# Patient Record
Sex: FEMALE | Race: WHITE | NOT HISPANIC OR LATINO
[De-identification: names, ages, dates, MRNs, and addresses within clinical notes are randomized per-mention and may not be internally consistent; named-entity substitution may affect disease eponyms.]

---
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
## 2024-03-20 RX ORDER — BLOOD SUGAR DIAGNOSTIC
STRIP MISCELLANEOUS
Qty: 600 | Refills: 3 | Status: ACTIVE | COMMUNITY
Start: 2019-07-03 | End: 1900-01-01

## 2024-05-20 ENCOUNTER — APPOINTMENT (OUTPATIENT)
Dept: PEDIATRIC ENDOCRINOLOGY | Facility: CLINIC | Age: 19
End: 2024-05-20

## 2024-05-20 ENCOUNTER — APPOINTMENT (OUTPATIENT)
Dept: PEDIATRIC ENDOCRINOLOGY | Facility: CLINIC | Age: 19
End: 2024-05-20
Payer: COMMERCIAL

## 2024-05-20 VITALS
SYSTOLIC BLOOD PRESSURE: 127 MMHG | HEART RATE: 101 BPM | WEIGHT: 140 LBS | BODY MASS INDEX: 22.23 KG/M2 | HEIGHT: 66.46 IN | DIASTOLIC BLOOD PRESSURE: 86 MMHG

## 2024-05-20 DIAGNOSIS — E10.65 TYPE 1 DIABETES MELLITUS WITH HYPERGLYCEMIA: ICD-10-CM

## 2024-05-20 LAB
BILIRUB UR QL STRIP: NORMAL
CLARITY UR: CLEAR
COLLECTION METHOD: NORMAL
GLUCOSE BLDC GLUCOMTR-MCNC: 125
GLUCOSE UR-MCNC: NORMAL
HBA1C MFR BLD HPLC: 9.8
HCG UR QL: 0.2 EU/DL
HGB UR QL STRIP.AUTO: NORMAL
KETONES UR-MCNC: NORMAL
LEUKOCYTE ESTERASE UR QL STRIP: NORMAL
NITRITE UR QL STRIP: NORMAL
PH UR STRIP: 6.5
PROT UR STRIP-MCNC: NORMAL
SP GR UR STRIP: 1.02

## 2024-05-20 PROCEDURE — 82962 GLUCOSE BLOOD TEST: CPT

## 2024-05-20 PROCEDURE — 95251 CONT GLUC MNTR ANALYSIS I&R: CPT

## 2024-05-20 PROCEDURE — 83036 HEMOGLOBIN GLYCOSYLATED A1C: CPT | Mod: QW

## 2024-05-20 PROCEDURE — 99215 OFFICE O/P EST HI 40 MIN: CPT | Mod: 25

## 2024-05-20 PROCEDURE — 81003 URINALYSIS AUTO W/O SCOPE: CPT | Mod: QW

## 2024-05-20 NOTE — ASSESSMENT
[FreeTextEntry1] : 18 year 10 month old female with poorly controlled type 1 diabetes. HbA1C 9.8% (decreased from 10.8% in December 2023). Some improvement in bolusing for food, however still multiple days without carb entry. Patient in automated mode 16% of the time (decreased from 37% at the last visit).  She has a hx microalbuminuria, likely due to uncontrolled diabetes.      Plan:  1. Emphasized importance of bolusing for all meals and correct high blood sugars to optimize diabetes control.   2. Change ISF 12am-6am from 60 to 55; 6am-12pm from 30 to 25; 12pm-12am from 25 to 20.  3. F/u Ophthalmology for annual dilated eye exam and have report faxed to us.  4. Will order repeat Albumin/Creatinine at the next visit.

## 2024-05-20 NOTE — PHYSICAL EXAM
[Healthy Appearing] : healthy appearing [Well Nourished] : well nourished [Normal Appearance] : normal appearance [Sharp Optic Discs] : sharp optic disc [Well formed] : well formed [Normally Set] : normally set [WNL for age] : within normal limits of age [None] : there were no thyroid nodules [Normal S1 and S2] : normal S1 and S2 [Clear to Ausculation Bilaterally] : clear to auscultation bilaterally [Abdomen Soft] : soft [Abdomen Tenderness] : non-tender [] : no hepatosplenomegaly [Normal] : normal  [Goiter] : no goiter [Murmur] : no murmurs [de-identified] : No lipohypertrophy [de-identified] : Intact distal pulses, no caluses

## 2024-05-20 NOTE — THERAPY
[Today's Date] : [unfilled] [Humalog] : Humalog [_____] :  [unfilled] units/hour [BG Target = ____] : BG Target = [unfilled] [Insulin Sensitivity Factor = ____] : Insulin Sensitivity Factor = [unfilled] [FreeTextEntry3] : [facility] 12am-6am=7; 6am-12pm=5; 12pm-12am=4

## 2024-05-20 NOTE — HISTORY OF PRESENT ILLNESS
[Other: ___] :  blood sugar levels are tested [unfilled] times per day [2-3] : the pump insertion site is changed every 2 - 3 days [Arms] : arms [Glucagon at Home] : has glucagon at home [Regular Periods] : regular periods [Previous Hypoglycemic Seizure] : has no history of hypoglycemic seizure [FreeTextEntry2] : Augusta is a 18 year 10 month old girl with type 1 diabetes on Omnipod 5 insulin pump here for a follow up visit.  Patient has been having recurrent episodes of sinusitis and received several courses of antibiotics in the past 3 month.  Denied hypoglycemia episodes.  Trying to bolus for food more consistently.  Most of the times in manual mode, as 'forgets to switch back to auto after correcting high blood sugars''.  Denied pump site issues, pump malfunction, MD/ER referral for diabetes related issues.   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 Using a Dexcom G6 - MISSED SHOTS: gives insulin before meals, misses multiple food boluses and corrections  - TIMES OF HYPERGLYCEMIA: most of day; as per patient gets angry and really thirsty; sometimes checks for ketones - TIMES OF HYPOGLYCEMIA: have more lows; treats with a juice box; shaky, dizzy and sweating; more frequent lows - PARENTAL PART IN CARE: patient takes own sugar, gives boluses, changes Dexcom and pods; changed pod every 3 days - RECENT HOSPITALIZATIONS: none - RECENT ILLNESS: sick for the last 2 months, s/p 3 different antibiotics; severe cold and allergy symptoms - ACTIVITY LEVEL: walks a lot at school campus 6 miles a day; - MENSTRUAL HISTORY: LMP: 5/7/2024 for 5 days; regular periods  - PUMP-SPECIFIC: primes pump at appropriate times, pump type is Omnipod 5, site change frequency every 2-3 days, arms and legs site type used is pod, pump insulin is Humalog; Dexcom is on legs too.  - DIABETES EDUCATION TOPICS COVERED: reviewed sick day guidelines, reviewed rotating pump sites, Reviewed pump site problems and prevention of DKA, when to check ketones;   Short Acting Insulin: Humalog  Basal Rate:  12 am= 1.65 unit/hr  3 am= 1.40 units/hr  6 am=1.35 units/hr  9 am=1.6 units/hr 12 pm= 1.95 units/hr  3 pm= 2 units/hr   Meal Bolus Insulin:  I:C: 12am-1:7  6am-1:5 12pm= 1:4  Correction Insulin:  ISF= 12 am= 60   6 am= 32 12pm=28  BG Target = 110 (corrects above 125) Insulin on Board (IOB) Duration = 3 hours     OTHER:  Eye exam- 05/18/2023 (report on file); has an appointment on Thursday Dental exam- 06/2022: Due; has an appointment for 6/14/2024 Labs- 06/1/2022; 12/18/2023-Lab [facility] Flu Vaccine given today 12/20/2023, VIS given; consent obtained.  Covid vaccine 9/11/21 & 10/3/2021, Pf[employer] Nutritionist:  asmita  Medical ID: has necklace at home, but does not wear at present; bracelet given on 12/21/22, 8/9/2023 and 3/7/23, information provided [FreeTextEntry1] : LMP 5/7/2024

## 2024-07-29 ENCOUNTER — APPOINTMENT (OUTPATIENT)
Dept: PEDIATRIC ENDOCRINOLOGY | Facility: CLINIC | Age: 19
End: 2024-07-29

## 2024-10-24 ENCOUNTER — RX RENEWAL (OUTPATIENT)
Age: 19
End: 2024-10-24

## 2024-12-23 ENCOUNTER — APPOINTMENT (OUTPATIENT)
Dept: PEDIATRIC ENDOCRINOLOGY | Facility: CLINIC | Age: 19
End: 2024-12-23
Payer: COMMERCIAL

## 2024-12-23 VITALS
WEIGHT: 141.8 LBS | BODY MASS INDEX: 22.52 KG/M2 | SYSTOLIC BLOOD PRESSURE: 125 MMHG | HEART RATE: 106 BPM | DIASTOLIC BLOOD PRESSURE: 85 MMHG | HEIGHT: 66.61 IN

## 2024-12-23 DIAGNOSIS — E10.65 TYPE 1 DIABETES MELLITUS WITH HYPERGLYCEMIA: ICD-10-CM

## 2024-12-23 LAB
GLUCOSE BLDC GLUCOMTR-MCNC: 138
HBA1C MFR BLD HPLC: 9.7

## 2024-12-23 PROCEDURE — 82962 GLUCOSE BLOOD TEST: CPT

## 2024-12-23 PROCEDURE — 83036 HEMOGLOBIN GLYCOSYLATED A1C: CPT | Mod: QW

## 2024-12-23 PROCEDURE — 99215 OFFICE O/P EST HI 40 MIN: CPT | Mod: 25

## 2024-12-23 PROCEDURE — 95251 CONT GLUC MNTR ANALYSIS I&R: CPT

## 2024-12-23 RX ORDER — BLOOD-GLUCOSE,RECEIVER,CONT
EACH MISCELLANEOUS
Qty: 1 | Refills: 0 | Status: ACTIVE | COMMUNITY
Start: 2024-12-23 | End: 1900-01-01

## 2024-12-23 RX ORDER — BLOOD-GLUCOSE SENSOR
EACH MISCELLANEOUS
Qty: 9 | Refills: 3 | Status: ACTIVE | COMMUNITY
Start: 2024-12-23 | End: 1900-01-01

## 2025-05-06 RX ORDER — INSULIN LISPRO 100 [IU]/ML
100 INJECTION, SOLUTION INTRAVENOUS; SUBCUTANEOUS
Qty: 3 | Refills: 2 | Status: ACTIVE | COMMUNITY
Start: 2025-05-06 | End: 1900-01-01

## 2025-05-12 ENCOUNTER — APPOINTMENT (OUTPATIENT)
Dept: PEDIATRIC ENDOCRINOLOGY | Facility: CLINIC | Age: 20
End: 2025-05-12
Payer: COMMERCIAL

## 2025-05-12 VITALS
SYSTOLIC BLOOD PRESSURE: 120 MMHG | BODY MASS INDEX: 26.55 KG/M2 | DIASTOLIC BLOOD PRESSURE: 80 MMHG | WEIGHT: 167.2 LBS | HEIGHT: 66.69 IN | HEART RATE: 81 BPM

## 2025-05-12 DIAGNOSIS — E10.65 TYPE 1 DIABETES MELLITUS WITH HYPERGLYCEMIA: ICD-10-CM

## 2025-05-12 LAB
GLUCOSE BLDC GLUCOMTR-MCNC: 142
HBA1C MFR BLD HPLC: 9.9

## 2025-05-12 PROCEDURE — 83036 HEMOGLOBIN GLYCOSYLATED A1C: CPT | Mod: QW

## 2025-05-12 PROCEDURE — 99215 OFFICE O/P EST HI 40 MIN: CPT | Mod: 25

## 2025-05-12 PROCEDURE — 82962 GLUCOSE BLOOD TEST: CPT

## 2025-05-12 PROCEDURE — 95251 CONT GLUC MNTR ANALYSIS I&R: CPT

## 2025-07-14 ENCOUNTER — NON-APPOINTMENT (OUTPATIENT)
Age: 20
End: 2025-07-14

## 2025-08-13 ENCOUNTER — APPOINTMENT (OUTPATIENT)
Dept: PEDIATRIC ENDOCRINOLOGY | Facility: CLINIC | Age: 20
End: 2025-08-13
Payer: COMMERCIAL

## 2025-08-13 VITALS
SYSTOLIC BLOOD PRESSURE: 119 MMHG | HEIGHT: 66.46 IN | WEIGHT: 139.7 LBS | HEART RATE: 106 BPM | DIASTOLIC BLOOD PRESSURE: 83 MMHG | BODY MASS INDEX: 22.19 KG/M2

## 2025-08-13 DIAGNOSIS — E10.65 TYPE 1 DIABETES MELLITUS WITH HYPERGLYCEMIA: ICD-10-CM

## 2025-08-13 LAB
BILIRUB UR QL STRIP: NORMAL
CLARITY UR: CLEAR
COLLECTION METHOD: NORMAL
GLUCOSE BLDC GLUCOMTR-MCNC: 210
GLUCOSE UR-MCNC: 250
HBA1C MFR BLD HPLC: 8.2
HCG UR QL: 0.2 EU/DL
HGB UR QL STRIP.AUTO: NORMAL
KETONES UR-MCNC: 15
LEUKOCYTE ESTERASE UR QL STRIP: NORMAL
NITRITE UR QL STRIP: NORMAL
PH UR STRIP: 6
PROT UR STRIP-MCNC: 100
SP GR UR STRIP: 1.02

## 2025-08-13 PROCEDURE — 99215 OFFICE O/P EST HI 40 MIN: CPT | Mod: 25

## 2025-08-13 PROCEDURE — 95251 CONT GLUC MNTR ANALYSIS I&R: CPT

## 2025-08-13 PROCEDURE — 81003 URINALYSIS AUTO W/O SCOPE: CPT | Mod: QW

## 2025-08-13 PROCEDURE — 82962 GLUCOSE BLOOD TEST: CPT

## 2025-08-13 PROCEDURE — 83036 HEMOGLOBIN GLYCOSYLATED A1C: CPT | Mod: QW
